# Patient Record
Sex: FEMALE | Race: OTHER | HISPANIC OR LATINO | URBAN - METROPOLITAN AREA
[De-identification: names, ages, dates, MRNs, and addresses within clinical notes are randomized per-mention and may not be internally consistent; named-entity substitution may affect disease eponyms.]

---

## 2020-06-12 ENCOUNTER — OFFICE VISIT (OUTPATIENT)
Dept: URBAN - METROPOLITAN AREA CLINIC 63 | Facility: CLINIC | Age: 60
End: 2020-06-12

## 2020-07-17 ENCOUNTER — OFFICE VISIT (OUTPATIENT)
Dept: URBAN - METROPOLITAN AREA CLINIC 63 | Facility: CLINIC | Age: 60
End: 2020-07-17

## 2020-10-01 ENCOUNTER — OFFICE VISIT (OUTPATIENT)
Dept: URBAN - METROPOLITAN AREA CLINIC 121 | Facility: CLINIC | Age: 60
End: 2020-10-01

## 2020-10-30 ENCOUNTER — OFFICE VISIT (OUTPATIENT)
Dept: URBAN - METROPOLITAN AREA CLINIC 63 | Facility: CLINIC | Age: 60
End: 2020-10-30

## 2020-11-24 ENCOUNTER — OFFICE VISIT (OUTPATIENT)
Dept: URBAN - METROPOLITAN AREA SURGERY CENTER 4 | Facility: SURGERY CENTER | Age: 60
End: 2020-11-24

## 2020-11-25 LAB — PATHOLOGY (INDENTED REPORT): (no result)

## 2020-12-11 ENCOUNTER — OFFICE VISIT (OUTPATIENT)
Dept: URBAN - METROPOLITAN AREA CLINIC 63 | Facility: CLINIC | Age: 60
End: 2020-12-11

## 2022-07-09 ENCOUNTER — TELEPHONE ENCOUNTER (OUTPATIENT)
Dept: URBAN - METROPOLITAN AREA CLINIC 121 | Facility: CLINIC | Age: 62
End: 2022-07-09

## 2022-07-09 RX ORDER — PANTOPRAZOLE SODIUM 40 MG/1
TAKE ONE TABLET BY MOUTH ONE TIME DAILY 30 TO 60 MINUTES PRIOR TO BREAKAFST TABLET, DELAYED RELEASE ORAL
Refills: 0 | OUTPATIENT
Start: 2018-03-19 | End: 2020-06-11

## 2022-07-09 RX ORDER — PANTOPRAZOLE SODIUM 40 MG/1
TAKE ONE TABLET BY MOUTH ONE TIME DAILY 30 TO 60 MINUTES PRIOR TO BREAKAFST TABLET, DELAYED RELEASE ORAL
Refills: 2 | OUTPATIENT
Start: 2018-06-18 | End: 2020-06-11

## 2022-07-09 RX ORDER — BISMUTH SUBSALICYLATE 525 MG/1
TABLET ORAL TAKE AS DIRECTED
Refills: 0 | OUTPATIENT
Start: 2009-08-07 | End: 2017-03-02

## 2022-07-09 RX ORDER — OMEPRAZOLE AND SODIUM BICARBONATE 40; 1100 MG/1; MG/1
CAPSULE ORAL ONCE A DAY
Refills: 4 | OUTPATIENT
Start: 2009-08-07 | End: 2020-06-11

## 2022-07-09 RX ORDER — PANTOPRAZOLE SODIUM 40 MG/1
ONCE A DAY 30-60 PRIOR TO BREAKFAST TABLET, DELAYED RELEASE ORAL ONCE A DAY
Refills: 4 | OUTPATIENT
Start: 2017-04-19 | End: 2017-12-18

## 2022-07-09 RX ORDER — OMEPRAZOLE AND SODIUM BICARBONATE 40; 1100 MG/1; MG/1
CAPSULE ORAL ONCE A DAY
Refills: 2 | OUTPATIENT
Start: 2009-05-15 | End: 2009-08-07

## 2022-07-09 RX ORDER — HYOSCYAMINE SULFATE 0.12 MG/1
TABLET, ORALLY DISINTEGRATING ORAL THREE TIMES A DAY
Refills: 1 | OUTPATIENT
Start: 2009-05-15 | End: 2020-06-11

## 2022-07-09 RX ORDER — SIMETHICONE 80 MG/1
TABLET, CHEWABLE ORAL
Refills: 0 | OUTPATIENT
Start: 2009-09-23 | End: 2020-06-11

## 2022-07-09 RX ORDER — DULOXETINE HCL 30 MG
CAPSULE,DELAYED RELEASE (ENTERIC COATED) ORAL
Refills: 0 | OUTPATIENT
Start: 2017-03-02 | End: 2017-04-19

## 2022-07-09 RX ORDER — AMITRIPTYLINE HYDROCHLORIDE 10 MG/1
TABLET, FILM COATED ORAL ONCE A DAY
Refills: 0 | OUTPATIENT
Start: 2017-03-02 | End: 2017-04-19

## 2022-07-09 RX ORDER — LINACLOTIDE 145 UG/1
ONCE A DAY CAPSULE, GELATIN COATED ORAL ONCE A DAY
Refills: 0 | OUTPATIENT
Start: 2020-06-12 | End: 2020-12-11

## 2022-07-09 RX ORDER — TRAMADOL HYDROCHLORIDE 50 MG/1
QD TABLET ORAL AS NEEDED
Refills: 0 | OUTPATIENT
Start: 2017-04-19 | End: 2020-06-11

## 2022-07-09 RX ORDER — TRAMADOL HYDROCHLORIDE 50 MG/1
QD TABLET ORAL AS NEEDED
Refills: 0 | OUTPATIENT
Start: 2017-03-02 | End: 2017-04-19

## 2022-07-09 RX ORDER — DULOXETINE HCL 30 MG
CAPSULE,DELAYED RELEASE (ENTERIC COATED) ORAL
Refills: 0 | OUTPATIENT
Start: 2017-04-19 | End: 2020-06-11

## 2022-07-09 RX ORDER — BUPROPION HYDROCHLORIDE 150 MG/1
TABLET, FILM COATED, EXTENDED RELEASE ORAL
Refills: 0 | OUTPATIENT
Start: 2020-05-04 | End: 2020-12-11

## 2022-07-09 RX ORDER — PANTOPRAZOLE SODIUM 40 MG/1
TAKE ONE TABLET BY MOUTH ONE TIME DAILY 30-60 MINTUES PRIOR TO BREAKFAST TABLET, DELAYED RELEASE ORAL
Refills: 2 | OUTPATIENT
Start: 2017-09-19 | End: 2020-06-11

## 2022-07-09 RX ORDER — AMITRIPTYLINE HYDROCHLORIDE 10 MG/1
TABLET, FILM COATED ORAL ONCE A DAY
Refills: 0 | OUTPATIENT
Start: 2017-04-19 | End: 2020-06-11

## 2022-07-09 RX ORDER — DICYCLOMINE HYDROCHLORIDE 20 MG/1
TABLET ORAL TAKE AS DIRECTED
Refills: 0 | OUTPATIENT
Start: 2009-08-07 | End: 2017-03-02

## 2022-07-09 RX ORDER — PANTOPRAZOLE SODIUM 40 MG/1
TAKE ONE TABLET BY MOUTH ONE TIME DAILY 30-60 MINTUES PRIOR TO BREAKFAST TABLET, DELAYED RELEASE ORAL TAKE AS DIRECTED
Refills: 2 | OUTPATIENT
Start: 2017-12-18 | End: 2020-06-11

## 2022-07-09 RX ORDER — BUSPIRONE HYDROCHLORIDE 10 MG/1
TABLET ORAL TAKE AS DIRECTED
Refills: 0 | OUTPATIENT
Start: 2009-08-07 | End: 2017-03-02

## 2022-07-09 RX ORDER — DICYCLOMINE HYDROCHLORIDE 20 MG/2ML
INJECTION, SOLUTION INTRAMUSCULAR THREE TIMES A DAY
Refills: 1 | OUTPATIENT
Start: 2009-06-10 | End: 2020-06-11

## 2022-07-09 RX ORDER — PEPPERMINT OIL
OIL (ML) MISCELLANEOUS THREE TIMES A DAY
Refills: 0 | OUTPATIENT
Start: 2009-06-10 | End: 2020-06-11

## 2022-07-10 ENCOUNTER — TELEPHONE ENCOUNTER (OUTPATIENT)
Dept: URBAN - METROPOLITAN AREA CLINIC 121 | Facility: CLINIC | Age: 62
End: 2022-07-10

## 2022-07-10 RX ORDER — GABAPENTIN 100 MG/1
CAPSULE ORAL
Refills: 0 | Status: ACTIVE | COMMUNITY
Start: 2020-05-27

## 2022-07-10 RX ORDER — AMITRIPTYLINE HYDROCHLORIDE 150 MG/1
TABLET, FILM COATED ORAL
Refills: 0 | Status: ACTIVE | COMMUNITY
Start: 2020-05-27

## 2022-07-10 RX ORDER — OMEPRAZOLE 40 MG/1
TWICE A DAY 30-60 MINUTES BEFORE BREAKFAST AND BEDTIME CAPSULE, DELAYED RELEASE ORAL TWICE A DAY
Refills: 0 | Status: ACTIVE | COMMUNITY
Start: 2020-12-11

## 2022-07-10 RX ORDER — ARIPIPRAZOLE 2 MG/1
TABLET ORAL
Refills: 0 | Status: ACTIVE | COMMUNITY
Start: 2020-06-02

## 2022-07-10 RX ORDER — OMEPRAZOLE 20 MG/1
CAPSULE, DELAYED RELEASE ORAL
Refills: 0 | Status: ACTIVE | COMMUNITY
Start: 2020-05-27

## 2022-07-10 RX ORDER — MULTIVIT-MIN/FOLIC/VIT K/LYCOP 400-300MCG
TABLET ORAL
Refills: 0 | Status: ACTIVE | COMMUNITY
Start: 2020-12-11

## 2023-10-25 ENCOUNTER — OFFICE VISIT (OUTPATIENT)
Dept: URBAN - METROPOLITAN AREA CLINIC 63 | Facility: CLINIC | Age: 63
End: 2023-10-25
Payer: MEDICARE

## 2023-10-25 ENCOUNTER — DASHBOARD ENCOUNTERS (OUTPATIENT)
Age: 63
End: 2023-10-25

## 2023-10-25 VITALS
WEIGHT: 142.2 LBS | HEIGHT: 59 IN | DIASTOLIC BLOOD PRESSURE: 60 MMHG | RESPIRATION RATE: 20 BRPM | SYSTOLIC BLOOD PRESSURE: 118 MMHG | BODY MASS INDEX: 28.67 KG/M2 | HEART RATE: 95 BPM | TEMPERATURE: 96.4 F | OXYGEN SATURATION: 96 %

## 2023-10-25 DIAGNOSIS — K59.01 SLOW TRANSIT CONSTIPATION: ICD-10-CM

## 2023-10-25 DIAGNOSIS — R19.4 CHANGE IN BOWEL HABITS: ICD-10-CM

## 2023-10-25 DIAGNOSIS — K62.5 RECTAL BLEEDING: ICD-10-CM

## 2023-10-25 DIAGNOSIS — R12 HEARTBURN: ICD-10-CM

## 2023-10-25 PROBLEM — 35298007: Status: ACTIVE | Noted: 2023-10-25

## 2023-10-25 PROCEDURE — 99213 OFFICE O/P EST LOW 20 MIN: CPT | Performed by: PHYSICIAN ASSISTANT

## 2023-10-25 RX ORDER — IBANDRONATE SODIUM 150 MG/1
1 TABLET 60 MINUTES BEFORE THE FIRST FOOD, BEVERAGE OR MEDICINE OF THE DAY WITH PLAIN WATER TABLET, FILM COATED ORAL
Status: ACTIVE | COMMUNITY

## 2023-10-25 RX ORDER — ARIPIPRAZOLE 2 MG/1
TABLET ORAL
Refills: 0 | Status: ACTIVE | COMMUNITY
Start: 2020-06-02

## 2023-10-25 RX ORDER — BUSPIRONE HYDROCHLORIDE 5 MG/1
1 TABLET TABLET ORAL TWICE A DAY
Status: ACTIVE | COMMUNITY

## 2023-10-25 RX ORDER — MULTIVITAMIN WITH IRON
1 TABLET WITH A MEAL TABLET ORAL ONCE A DAY
Status: ACTIVE | COMMUNITY

## 2023-10-25 RX ORDER — WHEAT DEXTRIN 1 G
AS DIRECTED TABLET,CHEWABLE ORAL
Status: ACTIVE | COMMUNITY

## 2023-10-25 RX ORDER — OMEPRAZOLE 40 MG/1
TWICE A DAY 30-60 MINUTES BEFORE BREAKFAST AND BEDTIME CAPSULE, DELAYED RELEASE ORAL TWICE A DAY
Refills: 0 | Status: ACTIVE | COMMUNITY
Start: 2020-12-11

## 2023-10-25 RX ORDER — AMITRIPTYLINE HYDROCHLORIDE 150 MG/1
TABLET, FILM COATED ORAL
Refills: 0 | Status: ACTIVE | COMMUNITY
Start: 2020-05-27

## 2023-10-25 RX ORDER — MELOXICAM 7.5 MG
1 TABLET TABLET ORAL ONCE A DAY
Status: ACTIVE | COMMUNITY

## 2023-10-25 RX ORDER — ATORVASTATIN CALCIUM 40 MG/1
1 TABLET TABLET, FILM COATED ORAL ONCE A DAY
Status: ACTIVE | COMMUNITY

## 2023-10-25 RX ORDER — METHYLDOPA/HYDROCHLOROTHIAZIDE 250MG-15MG
AS DIRECTED TABLET ORAL
Status: ACTIVE | COMMUNITY

## 2023-10-25 RX ORDER — ATOMOXETINE 18 MG/1
1 CAPSULE IN THE MORNING CAPSULE ORAL ONCE A DAY
Status: ACTIVE | COMMUNITY

## 2023-10-25 NOTE — PHYSICAL EXAM GASTROINTESTINAL
Abdomen , soft,, TTP epigastric area, nondistended , no guarding or rigidity , no masses palpable , normal bowel sounds , Liver and Spleen , no hepatomegaly present , no hepatosplenomegaly , liver nontender , spleen not palpable, No Ascites, No hernia

## 2023-10-25 NOTE — HPI-TODAY'S VISIT:
Bette is a 63 year old female Here to discuss colonoscopy for change in bowel habits.  Complains of new onset of constipation for several months. Often sees a small amount of bright red blood with wiping. Has an IBS flare every 2 months and sees a moderate amount of  blood in the toilet water. During a flare she will have colicky pain before and in between 3  bms.  She complains that the constipation is new. Since the beginning of this month she has been taking a fiber capsule one a day with 4 oz of water, mag 250 mg twice a day, and a probiotic once a day. At first on this regimen, she was having a bm every day, but then went back to one bm every 3 days.  C/o heartburn everyday that starts in the morning, burning substernal discomfort, eats something to relieve the pain. Has reflux at night/early evening almost every night. Denies nausea or vomiting. Denies dysphagia. Taking omeprazole 40 mg 1-2 times a day. Takes 2 if she is having greasy food for dinner. . . Last exam:2020 Family history of colon cancer: brother had colon cancer at age 60. . Denies history of stroke, heart attack, pacemaker, defibrillator or stents. Denies COPD, asthma Has sleep apnea, uses cpap.   Last colon 11/24/2020, recall in 2025. - Non-bleeding internal hemorrhoids. - No specimens collected Last EGD 11/24/2020 - LA Grade A reflux esophagitis. Biopsied. - 2 cm hiatal hernia. - Acute erosive gastritis. Biopsied. - Erythematous duodenopathy. . . LOV 12/11/2020, M.O.: Ms. Paniagua is a pleasant 60-year-old female evaluated in follow up of upper endoscopy and colonoscopy. During her last visit 10/30/2020 she complained of BRBPR about once weekly, occasional acid reflux, rare dysphagia. Having BM every 2-3 days, soft in consistency while taking MiraLax. Drinking 3 bottles of water daily. Has increased dietary fiber and Benefiber.  Recommended she continue with daily hydration, increasing fiber, and add miralax as needed. Today, she reports acid reflux is 75% improved. Having BM every 2 days occasionally hard if forgetting MiraLax and fiber. Is drinking 36 oz of water daily. Taking omeprazole 20 mg twice daily for 6 weeks, noticing improvement of symptoms. Admits snacking before bedtime, occasional Excedrin use. Denies use of alcohol, soda/cola. * Medication reconciliation performed. * Problem list reviewed. * The patient admits to tolerating the procedure quite well without any post procedure complications. The patient is symptomatic at this time and voices the above complaints. * Allergy list reviewed.

## 2023-11-01 ENCOUNTER — LAB OUTSIDE AN ENCOUNTER (OUTPATIENT)
Dept: URBAN - METROPOLITAN AREA CLINIC 63 | Facility: CLINIC | Age: 63
End: 2023-11-01

## 2023-12-07 ENCOUNTER — OFFICE VISIT (OUTPATIENT)
Dept: URBAN - METROPOLITAN AREA SURGERY CENTER 4 | Facility: SURGERY CENTER | Age: 63
End: 2023-12-07

## 2023-12-20 ENCOUNTER — OFFICE VISIT (OUTPATIENT)
Dept: URBAN - METROPOLITAN AREA CLINIC 63 | Facility: CLINIC | Age: 63
End: 2023-12-20